# Patient Record
Sex: MALE | Race: WHITE | URBAN - METROPOLITAN AREA
[De-identification: names, ages, dates, MRNs, and addresses within clinical notes are randomized per-mention and may not be internally consistent; named-entity substitution may affect disease eponyms.]

---

## 2022-06-07 ENCOUNTER — OFFICE VISIT (OUTPATIENT)
Dept: SURGERY | Age: 77
End: 2022-06-07
Payer: MEDICARE

## 2022-06-07 VITALS
HEART RATE: 78 BPM | TEMPERATURE: 97.3 F | DIASTOLIC BLOOD PRESSURE: 76 MMHG | HEIGHT: 67 IN | RESPIRATION RATE: 18 BRPM | WEIGHT: 133 LBS | BODY MASS INDEX: 20.88 KG/M2 | OXYGEN SATURATION: 98 % | SYSTOLIC BLOOD PRESSURE: 120 MMHG

## 2022-06-07 DIAGNOSIS — K40.20 NON-RECURRENT BILATERAL INGUINAL HERNIA WITHOUT OBSTRUCTION OR GANGRENE: Primary | ICD-10-CM

## 2022-06-07 PROCEDURE — 99215 OFFICE O/P EST HI 40 MIN: CPT | Performed by: SURGERY

## 2022-06-07 NOTE — PROGRESS NOTES
Vanessa Navas is a 67 y.o. male  Chief Complaint   Patient presents with    New Patient     Patient presents today for double hernia     Review of Systems   Constitutional: Negative. HENT: Positive for hearing loss. Eyes: Positive for blurred vision. Respiratory: Negative. Cardiovascular: Negative. Gastrointestinal: Negative. Genitourinary: Positive for frequency. Musculoskeletal: Negative. Skin: Positive for rash. Neurological: Negative. Endo/Heme/Allergies: Bruises/bleeds easily. Psychiatric/Behavioral: Negative.

## 2022-06-07 NOTE — PROGRESS NOTES
[facility]  General Surgery    Subjective:     CC: Left inguinal hernia     HPI: Patient is a very pleasant 19-year-old male with past medical history remarkable for atrial fibrillation, degenerative disc disease, macular degeneration, sarcoidosis, closed head injury and bilateral cataracts. He is self-referred for evaluation and management of left inguinal hernia repair. He is not sure if he wants the repair performed. He is apprehensive about having surgery because he has never had surgery previously. Patient Active Problem List    Diagnosis Date Noted    Cataracts, bilateral 07/08/2016    Closed head injury 10/18/2015    Head injury without skull fracture 10/09/2015    Hyponatremia 10/09/2015    A-fib ([address]     Degenerative disk disease     Macular degeneration     H/O sarcoidosis      Past Medical History:   Diagnosis Date    A-fib [facility])     Atrial fibrillation ([address] 7/2/2012    Degenerative disk disease     H/O sarcoidosis     Inguinal hernia     Macular degeneration     Skin cancer     scalp      Past Surgical History:   Procedure Laterality Date    HX REFRACTIVE SURGERY      HX VASECTOMY        Family History   Problem Relation Age of Onset    Diabetes Mother       Social History     Tobacco Use    Smoking status: Never Smoker    Smokeless tobacco: Never Used   Substance Use Topics    Alcohol use: Yes     Comment: occassionally      Allergies   Allergen Reactions    Pcn [Penicillins] Rash       Prior to Admission medications    Medication Sig Start Date End Date Taking? Authorizing Provider   vitamin E (AQUA GEMS) 400 unit capsule Take 400 Units by mouth daily. Yes Provider, Historical   BORAGE OIL PO Take  by mouth daily. Yes Provider, Historical   digoxin (LANOXIN) 0.125 mg tablet Take 1 Tab by mouth daily.   Patient not taking: Reported on 6/7/2022 7/8/16   Dede Blas MD   co-enzyme Q-10 (CO Q-10) 100 mg capsule Take 100 mg by mouth every Monday, Wednesday, Friday. Patient not taking: Reported on 6/7/2022    Provider, Historical   b complex vitamins (B COMPLEX 1) tablet Take 1 Tab by mouth daily. Patient not taking: Reported on 6/7/2022    Provider, Historical       Review of Systems:    14 systems were reviewed. The results are as above in the HPI and otherwise negative. Objective:     Vitals:    06/07/22 1043   BP: 120/76   Pulse: 78   Resp: 18   Temp: 97.3 °F (36.3 °C)   TempSrc: Temporal   SpO2: 98%   Weight: 60.3 kg (133 lb)   Height: 5' 7\" (1.702 m)       Physical Exam:  GENERAL: alert, cooperative, no distress, appears stated age,   EYE: conjunctivae/corneas clear. PERRL, EOM's intact. THROAT & NECK: normal and no erythema or exudates noted. ,    LYMPHATIC: Cervical, supraclavicular, and axillary nodes normal. ,   LUNG: clear to auscultation bilaterally,   HEART: regular rate and rhythm, S1, S2 normal, no murmur, click, rub or gallop,   ABDOMEN: soft, non-tender. Bowel sounds normal. No masses,  no organomegaly,   Genitalia:  Both testicles are descended into the scrotum. Penis is normal  Bilateral reducible inguinal hernias  EXTREMITIES:  extremities normal, atraumatic, no cyanosis or edema,   SKIN: Normal.,   NEUROLOGIC: AOx3. Cranial nerves 2-12 and sensation grossly intact. ,     Data Review:  CBC: No results found for: WBC, RBC, HGB, HCT, PLT, HGBEXT, HCTEXT, PLTEXT     Mr. Mitra Tobin has a reminder for a \"due or due soon\" health maintenance. I have asked that he contact his primary care provider for follow-up on this health maintenance. Impression:     · Patient with bilateral inguinal hernias which are reducible. Plan:     · Patient is considering hernia repair.   · We will follow-up once he has made his decision    Signed By: Vesta Wilkins MD     June 7, 2022

## 2022-06-08 NOTE — PATIENT INSTRUCTIONS
Inguinal Hernia Repair: Before Your Surgery  What is inguinal hernia repair? Inguinal hernia repair is a type of surgery. An inguinal hernia is a bulge under the skin in your groin. It happens when there is a weak spot in the groin muscle and a piece of the intestines or tissue pokes through the muscle. This can be painful. You may have pain when you're active. Or it may be painful when you strain during a bowel movement or lift something heavy. Surgery can help with your pain. It can also prevent serious problems that can happen if an organ or tissue gets stuck in the hernia. There are two ways to do this surgery. In open surgery, the doctor makes one cut near the hernia. This cut is called an incision. In laparoscopic surgery, the doctor makes several very small incisions and uses a thin, lighted scope and small tools. During surgery, the doctor pushes the bulge back in place. The doctor may place a piece of mesh on top of the bulge to help keep it in place. Then the healthy tissue is sewn back together. Laparoscopic surgery leaves several small scars. Open surgery leaves one long scar. The scars fade with time. After the surgery, you can probably return to light activity after 1 to 3 weeks. How long it takes will depend on the type of surgery. Follow-up care is a key part of your treatment and safety. Be sure to make and go to all appointments, and call your doctor if you are having problems. It's also a good idea to know your test results and keep a list of the medicines you take. How do you prepare for surgery? Surgery can be stressful. This information will help you understand what you can expect. And it will help you safely prepare for surgery. Preparing for surgery    · Be sure you have someone to take you home.  Anesthesia and pain medicine will make it unsafe for you to drive or get home on your own.     · Understand exactly what surgery is planned, along with the risks, benefits, and other options.     · If you take aspirin or some other blood thinner, ask your doctor if you should stop taking it before your surgery. Make sure that you understand exactly what your doctor wants you to do. These medicines increase the risk of bleeding.     · Tell your doctor ALL the medicines, vitamins, supplements, and herbal remedies you take. Some may increase the risk of problems during your surgery. Your doctor will tell you if you should stop taking any of them before the surgery and how soon to do it.     · Make sure your doctor and the hospital have a copy of your advance directive. If you don't have one, you may want to prepare one. It lets others know your health care wishes. It's a good thing to have before any type of surgery or procedure. What happens on the day of surgery? · Follow the instructions exactly about when to stop eating and drinking. If you don't, your surgery may be canceled. If your doctor told you to take your medicines on the day of surgery, take them with only a sip of water.     · Take a bath or shower before you come in for your surgery. Do not apply lotions, perfumes, deodorants, or nail polish.     · Do not shave the surgical site yourself.     · Take off all jewelry and piercings. And take out contact lenses, if you wear them. At the hospital or surgery center   · Bring a picture ID.     · The area for surgery is often marked to make sure there are no errors.     · You will be kept comfortable and safe by your anesthesia provider. The anesthesia may make you sleep. Or it may just numb the area being worked on.     · The surgery will take about 1 to 2 hours. When should you call your doctor? · You have questions or concerns.     · You don't understand how to prepare for your surgery.     · You become ill before the surgery (such as fever, flu, or a cold).     · You need to reschedule or have changed your mind about having the surgery. Where can you learn more?   Go to http://www.gray.com/  Enter P932 in the search box to learn more about \"Inguinal Hernia Repair: Before Your Surgery. \"  Current as of: September 8, 2021               Content Version: 13.2  © 2006-2022 [employer]. Care instructions adapted under license by [employer] (which disclaims liability or warranty for this information). If you have questions about a medical condition or this instruction, always ask your healthcare professional. [address] any warranty or liability for your use of this information.